# Patient Record
Sex: FEMALE | Race: BLACK OR AFRICAN AMERICAN | NOT HISPANIC OR LATINO | Employment: UNEMPLOYED | URBAN - METROPOLITAN AREA
[De-identification: names, ages, dates, MRNs, and addresses within clinical notes are randomized per-mention and may not be internally consistent; named-entity substitution may affect disease eponyms.]

---
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
## 2023-03-20 ENCOUNTER — LAB VISIT (OUTPATIENT)
Dept: LAB | Facility: CLINIC | Age: 18
End: 2023-03-20
Payer: MEDICAID

## 2023-03-20 ENCOUNTER — OFFICE VISIT (OUTPATIENT)
Dept: OBSTETRICS AND GYNECOLOGY | Facility: CLINIC | Age: 18
End: 2023-03-20
Payer: MEDICAID

## 2023-03-20 VITALS
HEART RATE: 84 BPM | BODY MASS INDEX: 21.74 KG/M2 | HEIGHT: 62 IN | SYSTOLIC BLOOD PRESSURE: 147 MMHG | WEIGHT: 118.13 LBS | DIASTOLIC BLOOD PRESSURE: 89 MMHG

## 2023-03-20 DIAGNOSIS — O20.0 THREATENED MISCARRIAGE: ICD-10-CM

## 2023-03-20 DIAGNOSIS — I10 CHRONIC HYPERTENSION: ICD-10-CM

## 2023-03-20 DIAGNOSIS — O20.0 THREATENED MISCARRIAGE: Primary | ICD-10-CM

## 2023-03-20 DIAGNOSIS — Z87.59 HISTORY OF POSTPARTUM HEMORRHAGE: ICD-10-CM

## 2023-03-20 PROBLEM — O99.019 ANTEPARTUM ANEMIA: Status: RESOLVED | Noted: 2021-08-06 | Resolved: 2023-03-20

## 2023-03-20 PROBLEM — Z30.9 ENCOUNTER FOR CONTRACEPTIVE MANAGEMENT: Status: RESOLVED | Noted: 2021-11-05 | Resolved: 2023-03-20

## 2023-03-20 LAB
BASOPHILS # BLD AUTO: 0.02 K/UL (ref 0–0.2)
BASOPHILS NFR BLD: 0.4 % (ref 0–1.9)
DIFFERENTIAL METHOD: ABNORMAL
EOSINOPHIL # BLD AUTO: 0 K/UL (ref 0–0.5)
EOSINOPHIL NFR BLD: 0.4 % (ref 0–8)
ERYTHROCYTE [DISTWIDTH] IN BLOOD BY AUTOMATED COUNT: 14.5 % (ref 11.5–14.5)
HCG INTACT+B SERPL-ACNC: NORMAL MIU/ML
HCT VFR BLD AUTO: 34.2 % (ref 37–48.5)
HGB BLD-MCNC: 11.1 G/DL (ref 12–16)
IMM GRANULOCYTES # BLD AUTO: 0.01 K/UL (ref 0–0.04)
IMM GRANULOCYTES NFR BLD AUTO: 0.2 % (ref 0–0.5)
LYMPHOCYTES # BLD AUTO: 1.5 K/UL (ref 1–4.8)
LYMPHOCYTES NFR BLD: 28.1 % (ref 18–48)
MCH RBC QN AUTO: 28.5 PG (ref 27–31)
MCHC RBC AUTO-ENTMCNC: 32.5 G/DL (ref 32–36)
MCV RBC AUTO: 88 FL (ref 82–98)
MONOCYTES # BLD AUTO: 0.3 K/UL (ref 0.3–1)
MONOCYTES NFR BLD: 6.1 % (ref 4–15)
NEUTROPHILS # BLD AUTO: 3.5 K/UL (ref 1.8–7.7)
NEUTROPHILS NFR BLD: 64.8 % (ref 38–73)
NRBC BLD-RTO: 0 /100 WBC
PLATELET # BLD AUTO: 266 K/UL (ref 150–450)
PMV BLD AUTO: 10.8 FL (ref 9.2–12.9)
RBC # BLD AUTO: 3.9 M/UL (ref 4–5.4)
WBC # BLD AUTO: 5.45 K/UL (ref 3.9–12.7)

## 2023-03-20 PROCEDURE — 3077F PR MOST RECENT SYSTOLIC BLOOD PRESSURE >= 140 MM HG: ICD-10-PCS | Mod: CPTII,S$GLB,, | Performed by: ADVANCED PRACTICE MIDWIFE

## 2023-03-20 PROCEDURE — 3079F DIAST BP 80-89 MM HG: CPT | Mod: CPTII,S$GLB,, | Performed by: ADVANCED PRACTICE MIDWIFE

## 2023-03-20 PROCEDURE — 3079F PR MOST RECENT DIASTOLIC BLOOD PRESSURE 80-89 MM HG: ICD-10-PCS | Mod: CPTII,S$GLB,, | Performed by: ADVANCED PRACTICE MIDWIFE

## 2023-03-20 PROCEDURE — 99213 OFFICE O/P EST LOW 20 MIN: CPT | Mod: TH,S$GLB,, | Performed by: ADVANCED PRACTICE MIDWIFE

## 2023-03-20 PROCEDURE — 3008F BODY MASS INDEX DOCD: CPT | Mod: CPTII,S$GLB,, | Performed by: ADVANCED PRACTICE MIDWIFE

## 2023-03-20 PROCEDURE — 36415 PR COLLECTION VENOUS BLOOD,VENIPUNCTURE: ICD-10-PCS | Mod: ,,, | Performed by: STUDENT IN AN ORGANIZED HEALTH CARE EDUCATION/TRAINING PROGRAM

## 2023-03-20 PROCEDURE — 99213 PR OFFICE/OUTPT VISIT, EST, LEVL III, 20-29 MIN: ICD-10-PCS | Mod: TH,S$GLB,, | Performed by: ADVANCED PRACTICE MIDWIFE

## 2023-03-20 PROCEDURE — 3077F SYST BP >= 140 MM HG: CPT | Mod: CPTII,S$GLB,, | Performed by: ADVANCED PRACTICE MIDWIFE

## 2023-03-20 PROCEDURE — 85025 COMPLETE CBC W/AUTO DIFF WBC: CPT | Performed by: ADVANCED PRACTICE MIDWIFE

## 2023-03-20 PROCEDURE — 36415 COLL VENOUS BLD VENIPUNCTURE: CPT | Mod: ,,, | Performed by: STUDENT IN AN ORGANIZED HEALTH CARE EDUCATION/TRAINING PROGRAM

## 2023-03-20 PROCEDURE — 84702 CHORIONIC GONADOTROPIN TEST: CPT | Performed by: ADVANCED PRACTICE MIDWIFE

## 2023-03-20 PROCEDURE — 3008F PR BODY MASS INDEX (BMI) DOCUMENTED: ICD-10-PCS | Mod: CPTII,S$GLB,, | Performed by: ADVANCED PRACTICE MIDWIFE

## 2023-03-20 NOTE — PROGRESS NOTES
"HISTORY OF PRESENT ILLNESS:    Luana Merlos is a 17 y.o. female, , Patient's last menstrual period was 2023.,  presents for a problem visit/threatened miscarriage. LMP of 23 = EDC of 10/31/23. Now ? 7 + 6/7. Seen at  [facility] on 3/4/23 for vaginal bleeding. US revealed IUP at 5 + 4/7 weeks with subchorionic hemorrhage. Quant HCG 15.900. Seen again at  [facility] ER on 3/6/23 after passage of "tissue like stuff". I am unable to view that note today at her visit. Since then, she c/o worsening N&V and intermittent dark brown spotting. No brvb, dysuria, fever/chills, supra pubic or abdominal pain. Pleasant, well groomed, NAD. ROS negative with exception of aforementioned:  Known to this provider from previous pregnancy.    Past Medical History:   Diagnosis Date    Gestational hypertension      OBHX:   19, second degree laceration repair. Male, # 8 lb 3 oz  Gestational HTN  tx with Labetalol, Chronic HTN tx by PCM with Norvasc (has stopped taking).  PMHX:  Denies major illness/injury  Umbilical hernia  SURGHX:  None  MEDS:  PNV  SOCHX:  Denies tobacco substance use or etoh in pregnancy.     History reviewed. No pertinent surgical history.    MEDICATIONS AND ALLERGIES:      Current Outpatient Medications:     prenatal vit,calc76-iron-folic 29 mg iron- 1 mg Tab, Take 1 tablet by mouth once daily., Disp: , Rfl:     Review of patient's allergies indicates:  No Known Allergies    Family History   Problem Relation Age of Onset    Hypertension Mother        Social History     Socioeconomic History    Marital status: Single   Tobacco Use    Smoking status: Never    Smokeless tobacco: Never   Substance and Sexual Activity    Alcohol use: Never    Drug use: Never    Sexual activity: Yes     Partners: Male     Birth control/protection: None     Comment: pap-never       ROS:  GENERAL: No weight changes. No swelling. No fatigue. No fever.  CARDIOVASCULAR: No chest pain. No shortness of breath. No leg " "cramps.   NEUROLOGICAL: No headaches. No vision changes.  BREASTS: No pain. No lumps. No discharge.  ABDOMEN: No pain.No constipation.+ N&V not relieved with comfort measures.  REPRODUCTIVE: See HPI  VULVA: No pain. No lesions. No itching.  VAGINA: No relaxation. No itching. No odor. No discharge. No lesions.  URINARY: No incontinence. No nocturia. No frequency. No dysuria.    BP (!) 147/89 (BP Location: Left arm, Patient Position: Sitting)   Pulse 84   Ht 5' 2" (1.575 m)   Wt 53.6 kg (118 lb 1.6 oz)   LMP 01/24/2023   BMI 21.60 kg/m²     PE:  APPEARANCE: Well nourished, well developed, in no acute distress.  ABDOMEN: Soft. No tenderness or masses. No hepatosplenomegaly. No hernias.  PELVIC: Deferred  EXTREMITIES: No edema      DIAGNOSIS & PLAN  1. Threatened miscarriage  hCG, quantitative    CBC Auto Differential    C. trachomatis/N. gonorrhoeae by AMP [facility]; Urine      2. Chronic hypertension        3. History of postpartum hemorrhage          Re orient to our collaborative ob/gyn practice.  Continue daily PNV.  RX for Zofran and Phenergan given/discussed.  S&S of SAB reinforced.  Quant HCG, CBC and Urine GC CHL today.  Discussed use of mychart for lab results and communication. At this point, plan NEELIMA in 2 weeks. She is aware that f/u may change based upon lab results. If quant reassuring, I will order US for dating/confirm FCA. Otherwise we will f/u as indicated.  Pelvic rest at this time.  "

## 2023-03-23 ENCOUNTER — PATIENT MESSAGE (OUTPATIENT)
Dept: OBSTETRICS AND GYNECOLOGY | Facility: CLINIC | Age: 18
End: 2023-03-23
Payer: MEDICAID

## 2023-03-23 DIAGNOSIS — Z36.89 ENCOUNTER FOR FETAL ANATOMIC SURVEY: ICD-10-CM

## 2023-03-23 DIAGNOSIS — O20.0 THREATENED MISCARRIAGE: ICD-10-CM

## 2023-04-03 ENCOUNTER — PROCEDURE VISIT (OUTPATIENT)
Dept: MATERNAL FETAL MEDICINE | Facility: CLINIC | Age: 18
End: 2023-04-03
Payer: MEDICAID

## 2023-04-03 DIAGNOSIS — O20.0 THREATENED MISCARRIAGE: ICD-10-CM

## 2023-04-03 PROCEDURE — 76801 OB US < 14 WKS SINGLE FETUS: CPT | Mod: S$GLB,,, | Performed by: OBSTETRICS & GYNECOLOGY

## 2023-04-03 PROCEDURE — 76801 US OB/GYN PROCEDURE (VIEWPOINT): ICD-10-PCS | Mod: S$GLB,,, | Performed by: OBSTETRICS & GYNECOLOGY

## 2023-07-07 ENCOUNTER — TELEPHONE (OUTPATIENT)
Dept: OBSTETRICS AND GYNECOLOGY | Facility: CLINIC | Age: 18
End: 2023-07-07

## 2023-07-07 NOTE — TELEPHONE ENCOUNTER
----- Message from Iraida Acevedo sent at 7/6/2023  4:55 PM CDT -----  Contact: Patient  Type:  Patient Returning Call    Who Called:Patient     Who Left Message for Patient:Unknown    Does the patient know what this is regarding?:appt scheduling     Would the patient rather a call back or a response via [facility]? Call    Best Call Back Number:[phone] (home)     Additional Information: Please return call

## 2023-08-02 ENCOUNTER — TELEPHONE (OUTPATIENT)
Dept: OBSTETRICS AND GYNECOLOGY | Facility: CLINIC | Age: 18
End: 2023-08-02

## 2023-08-02 NOTE — TELEPHONE ENCOUNTER
----- Message from Elaina Man CNM sent at 7/6/2023  7:18 PM CDT -----  Contact: Patient  More scheduling messages  ----- Message -----  From: Iraida Acevedo  Sent: 7/6/2023   4:55 PM CDT  To: Donovan Delaney Staff    Type:  Patient Returning Call    Who Called:Patient     Who Left Message for Patient:Unknown    Does the patient know what this is regarding?:appt scheduling     Would the patient rather a call back or a response via [facility]? Call    Best Call Back Number:[phone] ([place])     Additional Information: Please return call

## 2023-08-17 ENCOUNTER — LAB VISIT (OUTPATIENT)
Dept: LAB | Facility: CLINIC | Age: 18
End: 2023-08-17

## 2023-08-17 ENCOUNTER — OFFICE VISIT (OUTPATIENT)
Dept: OBSTETRICS AND GYNECOLOGY | Facility: CLINIC | Age: 18
End: 2023-08-17

## 2023-08-17 VITALS
SYSTOLIC BLOOD PRESSURE: 151 MMHG | WEIGHT: 127 LBS | HEIGHT: 63 IN | BODY MASS INDEX: 22.5 KG/M2 | DIASTOLIC BLOOD PRESSURE: 95 MMHG

## 2023-08-17 DIAGNOSIS — I10 CHRONIC HYPERTENSION: ICD-10-CM

## 2023-08-17 DIAGNOSIS — Z91.89 AT RISK FOR SEXUALLY TRANSMITTED DISEASE DUE TO UNPROTECTED SEX: ICD-10-CM

## 2023-08-17 DIAGNOSIS — Z30.42 ENCOUNTER FOR SURVEILLANCE OF INJECTABLE CONTRACEPTIVE: Primary | ICD-10-CM

## 2023-08-17 LAB
B-HCG UR QL: NEGATIVE
CTP QC/QA: YES

## 2023-08-17 PROCEDURE — 99214 OFFICE O/P EST MOD 30 MIN: CPT | Mod: 25,S$GLB,,

## 2023-08-17 PROCEDURE — 81025 URINE PREGNANCY TEST: CPT | Mod: S$GLB,,,

## 2023-08-17 PROCEDURE — 96372 PR INJECTION,THERAP/PROPH/DIAG2ST, IM OR SUBCUT: ICD-10-PCS | Mod: S$GLB,,,

## 2023-08-17 PROCEDURE — 96372 THER/PROPH/DIAG INJ SC/IM: CPT | Mod: S$GLB,,,

## 2023-08-17 PROCEDURE — 81025 POCT URINE PREGNANCY: ICD-10-PCS | Mod: S$GLB,,,

## 2023-08-17 PROCEDURE — 36415 PR COLLECTION VENOUS BLOOD,VENIPUNCTURE: ICD-10-PCS | Mod: ,,, | Performed by: STUDENT IN AN ORGANIZED HEALTH CARE EDUCATION/TRAINING PROGRAM

## 2023-08-17 PROCEDURE — 87389 HIV-1 AG W/HIV-1&-2 AB AG IA: CPT

## 2023-08-17 PROCEDURE — 99214 PR OFFICE/OUTPT VISIT, EST, LEVL IV, 30-39 MIN: ICD-10-PCS | Mod: 25,S$GLB,,

## 2023-08-17 PROCEDURE — 87591 N.GONORRHOEAE DNA AMP PROB: CPT

## 2023-08-17 PROCEDURE — 80074 ACUTE HEPATITIS PANEL: CPT

## 2023-08-17 PROCEDURE — 86592 SYPHILIS TEST NON-TREP QUAL: CPT

## 2023-08-17 PROCEDURE — 36415 COLL VENOUS BLD VENIPUNCTURE: CPT | Mod: ,,, | Performed by: STUDENT IN AN ORGANIZED HEALTH CARE EDUCATION/TRAINING PROGRAM

## 2023-08-17 RX ORDER — MEDROXYPROGESTERONE ACETATE 150 MG/ML
150 INJECTION, SUSPENSION INTRAMUSCULAR
Status: SHIPPED | OUTPATIENT
Start: 2023-08-17 | End: 2024-08-11

## 2023-08-17 RX ORDER — AMLODIPINE BESYLATE 5 MG/1
5 TABLET ORAL DAILY
Qty: 30 TABLET | Refills: 0 | Status: SHIPPED | OUTPATIENT
Start: 2023-08-17 | End: 2023-09-16

## 2023-08-17 RX ADMIN — MEDROXYPROGESTERONE ACETATE 150 MG: 150 INJECTION, SUSPENSION INTRAMUSCULAR at 02:08

## 2023-08-17 NOTE — PROGRESS NOTES
SUBJECTIVE:       Chief Complaint: Contraception    History of Present Illness: Luana Merlos is a 17 y.o. female  presenting for discussion of contraception and STD testing. Patient reports she is currently sexually active with a new male partner. She currently uses nothing for contraception. She  sometimes  use condoms for STD/STI prevention.    Review of Systems   Constitutional:  Negative for activity change, appetite change, chills, fatigue, fever and unexpected weight change.   HENT:  Negative for nasal congestion, dental problem, ear pain, postnasal drip, rhinorrhea, sinus pressure/congestion, sneezing and sore throat.    Eyes:  Negative for discharge, visual disturbance and eye dryness.   Respiratory:  Negative for cough, chest tightness and shortness of breath.    Cardiovascular:  Negative for chest pain, palpitations and leg swelling.   Gastrointestinal:  Negative for abdominal distention, abdominal pain, change in bowel habit, constipation, diarrhea, nausea, vomiting, reflux and change in bowel habit.   Endocrine: Negative for cold intolerance, heat intolerance, polydipsia and polyuria.   Genitourinary:  Negative for difficulty urinating, dyspareunia, dysuria, frequency, genital sores, hot flashes, menstrual irregularity, menstrual problem, pelvic pain, urgency, vaginal bleeding, vaginal discharge, vaginal pain and vaginal dryness.   Musculoskeletal:  Negative for back pain, myalgias, neck pain and neck stiffness.   Integumentary:  Negative for rash, breast mass, breast discharge and breast tenderness.   Allergic/Immunologic: Negative for environmental allergies and immunocompromised state.   Neurological:  Negative for dizziness, syncope, weakness, light-headedness, numbness and headaches.   Hematological:  Negative for adenopathy. Does not bruise/bleed easily.   Psychiatric/Behavioral:  Negative for confusion, decreased concentration and sleep disturbance. The patient is not  "nervous/anxious.    Breast: Negative for mass and tenderness        OBJECTIVE:      BP (!) 151/95 (BP Location: Right arm, Patient Position: Sitting)   Ht 5' 3" (1.6 m)   Wt 57.6 kg (127 lb)   LMP 07/21/2023 (Exact Date)   BMI 22.50 kg/m²     Physical Exam:   Constitutional: She is oriented to person, place, and time. Vital signs are normal. She appears well-developed and well-nourished. She is cooperative.    HENT:   Head: Normocephalic and atraumatic.      Cardiovascular:  Normal rate and regular rhythm.             Pulmonary/Chest: Effort normal.                  Musculoskeletal: Moves all extremeties.      Right lower leg: No edema.      Left lower leg: No edema.       Neurological: She is alert and oriented to person, place, and time. GCS eye subscore is 4. GCS verbal subscore is 5. GCS motor subscore is 6.    Skin: Skin is warm and dry. Capillary refill takes less than 2 seconds.    Psychiatric: She has a normal mood and affect. Her speech is normal and behavior is normal. Mood, affect and thought content normal.         ASSESSMENT:       1. Encounter for surveillance of injectable contraceptive    2. Chronic hypertension    3. At risk for sexually transmitted disease due to unprotected sex          PLAN:     Encounter for surveillance of injectable contraceptive  -     POCT Urine Pregnancy  -     medroxyPROGESTERone (DEPO-PROVERA) injection 150 mg    Chronic hypertension  -     amLODIPine (NORVASC) 5 MG tablet; Take 1 tablet (5 mg total) by mouth once daily.  Dispense: 30 tablet; Refill: 0    At risk for sexually transmitted disease due to unprotected sex  -     C. trachomatis/N. gonorrhoeae by AMP [facility]; Urine  -     HIV 1/2 Ag/Ab (4th Gen); Future; Expected date: 08/17/2023  -     Hepatitis Panel, Acute; Future; Expected date: 08/17/2023  -     RPR; Future; Expected date: 08/17/2023    Patient counseled today regarding contraceptive options including oral contraceptive pills, NuvaRing, transdermal " patch, depo-medroxyprogesterone injection, intrauterine device, Nexplanon. Risks, benefits, and alternatives of all these were discussed at length. Patient has chosen Depo-Provera injections. Administration compliance discussed. STD/STI counseling provided. Patient understands this does not protect against STDs/STIs and that the only current method of protection against STDs/STIs is consistent condom use. Educated on yearly STD screenings. STD/STI counseling provided. All questions answered. Patient verbalized understanding.    Follow up in about 3 months (around 11/17/2023) for Depo-Provera injection.

## 2023-08-18 LAB
C TRACH DNA SPEC QL NAA+PROBE: DETECTED
HAV IGM SERPL QL IA: NORMAL
HBV CORE IGM SERPL QL IA: NORMAL
HBV SURFACE AG SERPL QL IA: NORMAL
HCV AB SERPL QL IA: NORMAL
HIV 1+2 AB+HIV1 P24 AG SERPL QL IA: NORMAL
N GONORRHOEA DNA SPEC QL NAA+PROBE: NOT DETECTED
RPR SER QL: NORMAL

## 2023-08-22 DIAGNOSIS — A74.9 CHLAMYDIA INFECTION: Primary | ICD-10-CM

## 2023-08-22 RX ORDER — DOXYCYCLINE 100 MG/1
100 CAPSULE ORAL 2 TIMES DAILY
Qty: 14 CAPSULE | Refills: 0 | Status: SHIPPED | OUTPATIENT
Start: 2023-08-22 | End: 2023-08-29

## 2023-09-18 ENCOUNTER — PATIENT MESSAGE (OUTPATIENT)
Dept: OBSTETRICS AND GYNECOLOGY | Facility: CLINIC | Age: 18
End: 2023-09-18

## 2024-07-11 ENCOUNTER — OFFICE VISIT (OUTPATIENT)
Dept: FAMILY MEDICINE | Facility: CLINIC | Age: 19
End: 2024-07-11
Payer: MEDICAID

## 2024-07-11 VITALS
HEART RATE: 106 BPM | TEMPERATURE: 99 F | WEIGHT: 135.13 LBS | SYSTOLIC BLOOD PRESSURE: 128 MMHG | BODY MASS INDEX: 23.93 KG/M2 | DIASTOLIC BLOOD PRESSURE: 80 MMHG

## 2024-07-11 DIAGNOSIS — I10 CHRONIC HYPERTENSION: Primary | ICD-10-CM

## 2024-07-11 PROCEDURE — 3074F SYST BP LT 130 MM HG: CPT | Mod: CPTII,S$GLB,, | Performed by: FAMILY MEDICINE

## 2024-07-11 PROCEDURE — 99213 OFFICE O/P EST LOW 20 MIN: CPT | Mod: S$GLB,,, | Performed by: FAMILY MEDICINE

## 2024-07-11 PROCEDURE — 3079F DIAST BP 80-89 MM HG: CPT | Mod: CPTII,S$GLB,, | Performed by: FAMILY MEDICINE

## 2024-07-11 PROCEDURE — 3008F BODY MASS INDEX DOCD: CPT | Mod: CPTII,S$GLB,, | Performed by: FAMILY MEDICINE

## 2024-07-11 PROCEDURE — G2211 COMPLEX E/M VISIT ADD ON: HCPCS | Mod: S$GLB,,, | Performed by: FAMILY MEDICINE

## 2024-07-11 NOTE — PROGRESS NOTES
[facility] - [place], [place]    Family Medicine Office Visit    Chief Complaint   Patient presents with    Headache        HPI:  18 female with history of htn.  Had been experiencing headaches, so stopped amlodpine.  Headaches have resolved over the past month off of medication.    Wants general lab assessment to make sure healthy off of medication    ROS: as above    Vitals:    07/11/24 1208   BP: 128/80   Pulse: 106   Temp: 99.4 °F (37.4 °C)   Weight: 61.3 kg (135 lb 1.6 oz)      Body mass index is 23.93 kg/m².      General:  AOx3, well nourished and developed in no acute distress  Eyes:  PERRLA, EOMI, vision intact grossly  ENT:  normal hearing, moist oral mucosa  Neck:  trachea midline with no masses or thyromegaly  Heart:  RRR, no murmurs.  No edema noted, extremities warm and well perfused  Lungs:  clear to auscultation bilaterally with symmetric chest movement  Abdomen:  Soft, nontender, nondistended.  Normal bowel sounds  Musculoskeletal:  Normal gait.  Normal posture.  Normal muscular development with no joint swelling.  Neurological:  CN II-XII grossly intact. Symmetric strength and sensation  Psych:  Normal mood and affect.  Able to demonstrate good judgement and personal insight.      Assessment/Plan:    1. Chronic hypertension  -     CBC Auto Differential; Future; Expected date: 07/11/2024  -     Comprehensive Metabolic Panel; Future; Expected date: 07/11/2024  -     Hemoglobin A1C; Future; Expected date: 07/11/2024  -     Lipid Panel; Future; Expected date: 07/11/2024  -     TSH; Future; Expected date: 07/11/2024         Agree with efforts to reduce/eliminate medication.  Get annual labs    Visit today included increased complexity associated with the care of the episodic problem htn addressed and managing the longitudinal care of the patient due to the serious and/or complex managed problem(s) htn.

## 2025-01-09 ENCOUNTER — PATIENT MESSAGE (OUTPATIENT)
Dept: ADMINISTRATIVE | Facility: HOSPITAL | Age: 20
End: 2025-01-09
Payer: MEDICAID

## 2025-01-31 ENCOUNTER — OFFICE VISIT (OUTPATIENT)
Dept: FAMILY MEDICINE | Facility: CLINIC | Age: 20
End: 2025-01-31
Payer: MEDICAID

## 2025-01-31 VITALS
HEART RATE: 80 BPM | DIASTOLIC BLOOD PRESSURE: 88 MMHG | HEIGHT: 63 IN | SYSTOLIC BLOOD PRESSURE: 136 MMHG | BODY MASS INDEX: 24.54 KG/M2 | OXYGEN SATURATION: 100 % | WEIGHT: 138.5 LBS

## 2025-01-31 DIAGNOSIS — Z12.4 CERVICAL CANCER SCREENING: Primary | ICD-10-CM

## 2025-01-31 PROCEDURE — 1159F MED LIST DOCD IN RCRD: CPT | Mod: CPTII,S$GLB,, | Performed by: STUDENT IN AN ORGANIZED HEALTH CARE EDUCATION/TRAINING PROGRAM

## 2025-01-31 PROCEDURE — 99213 OFFICE O/P EST LOW 20 MIN: CPT | Mod: S$GLB,,, | Performed by: STUDENT IN AN ORGANIZED HEALTH CARE EDUCATION/TRAINING PROGRAM

## 2025-01-31 PROCEDURE — 1160F RVW MEDS BY RX/DR IN RCRD: CPT | Mod: CPTII,S$GLB,, | Performed by: STUDENT IN AN ORGANIZED HEALTH CARE EDUCATION/TRAINING PROGRAM

## 2025-01-31 PROCEDURE — 3079F DIAST BP 80-89 MM HG: CPT | Mod: CPTII,S$GLB,, | Performed by: STUDENT IN AN ORGANIZED HEALTH CARE EDUCATION/TRAINING PROGRAM

## 2025-01-31 PROCEDURE — 88175 CYTOPATH C/V AUTO FLUID REDO: CPT | Performed by: STUDENT IN AN ORGANIZED HEALTH CARE EDUCATION/TRAINING PROGRAM

## 2025-01-31 PROCEDURE — 3008F BODY MASS INDEX DOCD: CPT | Mod: CPTII,S$GLB,, | Performed by: STUDENT IN AN ORGANIZED HEALTH CARE EDUCATION/TRAINING PROGRAM

## 2025-01-31 PROCEDURE — 3075F SYST BP GE 130 - 139MM HG: CPT | Mod: CPTII,S$GLB,, | Performed by: STUDENT IN AN ORGANIZED HEALTH CARE EDUCATION/TRAINING PROGRAM

## 2025-01-31 NOTE — PATIENT INSTRUCTIONS
Hi [place],     If you are due for any health screening(s) below please notify me so we can arrange them to be ordered and scheduled. Most healthy patients at your age complete them, but you are free to accept or refuse.     If you can't do it, I'll definitely understand. If you can, I'd certainly appreciate it!    Tests to Keep You Healthy    Cervical Cancer Screening: ORDERED  Last Blood Pressure <= 139/89 (1/31/2025): Yes

## 2025-01-31 NOTE — PROGRESS NOTES
[facility] - [place], [place]    Family Medicine Office Visit    Subjective     Patient ID: Luana Merlos is a 19 y.o. female who presents to clinic today to follow up.     Medical history, surgical history, medications, allergies, and social history were reviewed and updated.     Chief Complaint: Gynecologic Exam    Gynecologic Exam        Presents today to complete her Pap smear.  This has her 1st 1.  She is taking Depo for birth control.  Last cycle was in December.  No abnormal vaginal bleeding or discharge.  No history of cervical cancer, colposcopy, biopsy, or cryotherapy.    Vitals:    01/31/25 1718   BP: 136/88   Pulse: 80      Wt Readings from Last 3 Encounters:   01/31/25 1718 62.8 kg (138 lb 8 oz)   07/11/24 1208 61.3 kg (135 lb 1.6 oz)   08/17/23 1354 57.6 kg (127 lb) (48%, Z= -0.05)*     * Growth percentiles are based on CDC (Girls, 2-20 Years) data.      Review of Systems    Review of Systems otherwise negative unless specified above.        Objective     Physical Exam  Vitals reviewed. Exam conducted with a chaperone present.   Constitutional:       General: She is not in acute distress.     Appearance: Normal appearance.   HENT:      Head: Normocephalic and atraumatic.      Nose: Nose normal.      Mouth/Throat:      Mouth: Mucous membranes are moist.   Cardiovascular:      Rate and Rhythm: Normal rate and regular rhythm.      Heart sounds: No murmur heard.  Pulmonary:      Effort: Pulmonary effort is normal. No respiratory distress.      Breath sounds: Normal breath sounds.   Chest:   Breasts:     Right: No mass, nipple discharge, skin change or tenderness.      Left: No mass, nipple discharge, skin change or tenderness.   Musculoskeletal:         General: Normal range of motion.   Skin:     General: Skin is warm and dry.   Neurological:      General: No focal deficit present.      Mental Status: She is alert and oriented to person, place, and time.   Psychiatric:          Mood and Affect: Mood normal.         Behavior: Behavior normal.     Pelvic exam: normal external genitalia, vulva, vagina, cervix, uterus and adnexa, VULVA: normal appearing vulva with no masses, tenderness or lesions, VAGINA: normal appearing vagina with normal color and discharge, no lesions, CERVIX: normal appearing cervix without discharge or lesions, PAP: Pap smear done today, exam chaperoned by Denise Montoya MA.         Assessment and Plan     Current Outpatient Medications   Medication Instructions    prenatal vit,calc76-iron-folic 29 mg iron- 1 mg Tab 1 tablet, Daily        1. Cervical cancer screening  -     Liquid-Based Pap Smear, Screening        Pap smear completed today.  We will notify patient of results when available.  Otherwise, we will schedule follow up with PCP after seeing cardiology at [facility].         Follow up in about 3 months (around 4/30/2025) for Follow up with PCP.    Questions were invited and answered. No other acute concerns at this time. Will plan to follow up as above or sooner if needed.     Farzaneh Foley DO  01/31/2025 5:43 PM       This dictation has been generated using Modal Fluency Dictation. Some phonetic errors may occur. Please contact author for clarification if needed.

## 2025-02-06 LAB
FINAL PATHOLOGIC DIAGNOSIS: NORMAL
Lab: NORMAL

## 2025-03-28 ENCOUNTER — OFFICE VISIT (OUTPATIENT)
Dept: FAMILY MEDICINE | Facility: CLINIC | Age: 20
End: 2025-03-28
Payer: MEDICAID

## 2025-03-28 VITALS
SYSTOLIC BLOOD PRESSURE: 110 MMHG | WEIGHT: 145.31 LBS | OXYGEN SATURATION: 99 % | DIASTOLIC BLOOD PRESSURE: 68 MMHG | HEIGHT: 63 IN | BODY MASS INDEX: 25.75 KG/M2 | HEART RATE: 107 BPM

## 2025-03-28 DIAGNOSIS — K52.9 DYSPEPTIC DIARRHEA: Primary | ICD-10-CM

## 2025-03-28 RX ORDER — FAMOTIDINE 40 MG/1
40 TABLET, FILM COATED ORAL DAILY
Qty: 15 TABLET | Refills: 0 | Status: SHIPPED | OUTPATIENT
Start: 2025-03-28 | End: 2025-04-12

## 2025-03-28 RX ORDER — MEDROXYPROGESTERONE ACETATE 150 MG/ML
150 INJECTION, SUSPENSION INTRAMUSCULAR
COMMUNITY

## 2025-03-28 NOTE — PROGRESS NOTES
Subjective:       Patient ID: Luana Merlos is a 19 y.o. female.    Chief Complaint: Abdominal Pain (Pt is here for abdominal pain, bloated)      History of Present Illness    CHIEF COMPLAINT:  La presents with complaints of bloating, gas, abdominal pain, nausea, diarrhea, and sulfur burps.    HPI:  La reports significant bloating and gas buildup, with difficulty releasing gas. She describes abdominal pain that occurs after eating, accompanied by nausea. She also has episodes of diarrhea and sulfur burps. The duration of these symptoms is not specified, but she indicates noticing the problem for some time, initially dismissing it.    Yesterday, her abdomen was significantly distended and firm, but today it is less firm, suggesting variability in symptom severity. She also reports occasional heartburn.    La recently had an ultrasound at [facility] to check for ovarian cysts due to pelvic pain. She accessed the results through her patient portal, which showed everything was normal with no cysts present. She is currently not taking birth control, having taken a break from using the Depo shot (which is administered every 3 months).    She denies being pregnant.    MEDICATIONS:  La was on birth control (Depo shot) administered as an injection every 3 months. She has discontinued the Depo shot and is currently taking a break from birth control.    MEDICAL HISTORY:  La has a history of ovarian cysts. A recent ultrasound was performed, and the results were normal with no cysts found. She also has a history of acid reflux/heartburn. La was previously on Depo shot for contraception but is not currently taking any form of birth control. She denies the possibility of pregnancy.    IMAGING:  A pelvic ultrasound was performed a few days ago. The results were normal, with no cysts or other abnormalities detected.         Review of Systems   Constitutional:  Negative for activity change, fever and unexpected weight  change.   Respiratory: Negative.     Cardiovascular: Negative.    Gastrointestinal:  Positive for abdominal distention, diarrhea and reflux.   Neurological: Negative.          Objective:      Physical Exam  Vitals and nursing note reviewed.   Constitutional:       Appearance: Normal appearance.   Cardiovascular:      Rate and Rhythm: Normal rate and regular rhythm.   Pulmonary:      Effort: Pulmonary effort is normal.      Breath sounds: Normal breath sounds.   Abdominal:      General: Bowel sounds are normal. There is no distension.      Palpations: Abdomen is soft.      Tenderness: There is no abdominal tenderness.   Musculoskeletal:      Cervical back: Normal range of motion and neck supple.   Neurological:      Mental Status: She is alert.         Assessment:       1. Dyspeptic diarrhea  Overview:  Indigestion    Assessment & Plan:  Add OTC meds  Reflux      Other orders  -     famotidine (PEPCID) 40 MG tablet; Take 1 tablet (40 mg total) by mouth once daily. for 15 days  Dispense: 15 tablet; Refill: 0           Plan:       Assessment & Plan    R14.0 Abdominal distension (gaseous)  G89.18 Other acute postprocedural pain  R11.0 Nausea  R19.7 Diarrhea, unspecified  K21.9 Gastro-esophageal reflux disease without esophagitis  Z87.42 Personal history of other diseases of the female genital tract    IMPRESSION:  - Assessed symptoms of bloating, gas, abdominal pain, nausea, diarrhea, and sulfur burps.  - Noted recent normal pelvic US results.  - Evaluated for potential acid reflux and indigestion.  - Considered OTC remedies for gas and diarrhea.    ABDOMINAL DISTENSION (GASEOUS):  - Observed the patient's belly to be slightly distended.  - Noted that the patient's belly was worse yesterday, being big and hard.  - Inquired about OTC treatments and duration of symptoms.  - Recommend purchasing OTC Gas-X for gas relief.  - Advised avoiding hard-to-digest foods like fast food, Mexican food, and fried food.  - Instructed  the patient to report experiencing bloating, gas buildup, and difficulty releasing gas.  - Noted the patient's report of sulfur burps.  - Explained that avoiding fast food, Mexican food, fried food and other hard-to-digest items may help improve symptoms until symptoms improve.    PELVIC PAIN:  - Noted the patient's report of pain over pelvic area, which led to an ultrasound.  - Reviewed ultrasound results, which were normal and showed no cysts.    NAUSEA:  - Noted the patient's report of experiencing nausea after eating.  - Prescribed Famotidine 20mg to be taken twice daily for indigestion.  - Recommend OTC Bismuth subsalicylate for additional symptom relief.    DIARRHEA:  - Noted the patient's report of experiencing diarrhea.  - Inquired about constipation or diarrhea.  - Recommend purchasing OTC Bismuth subsalicylate or Loperamide for diarrhea management.    GASTRO-ESOPHAGEAL REFLUX DISEASE:  - Noted the patient's report of experiencing heartburn sometimes.  - Prescribed Famotidine 20mg, to be taken twice daily before breakfast and dinner for indigestion and acid reflux.  - Instructed to continue this regimen for a few weeks.    FEMALE GENITAL TRACT HEALTH:  - Reviewed recent ultrasound results, which were normal and showed no ovarian cysts.  - Inquired about gynecologist visits and ultrasound results.  - Recommend discussing HPV vaccine at next gynecologist visit.  - Educated the patient on the importance of HPV vaccine for cancer prevention.    FOLLOW-UP:  - Information about treatment steps will be sent to patient's [employer] account.       Luana Toribio was seen today for abdominal pain.    Diagnoses and all orders for this visit:    Dyspeptic diarrhea    Other orders  -     famotidine (PEPCID) 40 MG tablet; Take 1 tablet (40 mg total) by mouth once daily. for 15 days            I spent a total of 20 minutes on the day of the visit.  This includes face to face time and non-face to face time preparing to see the  patient (eg, review of tests), obtaining and/or reviewing separately obtained history, documenting clinical information in the electronic or other health record, independently interpreting results and communicating results to the patient/family/caregiver, or care coordinator.

## 2025-05-19 ENCOUNTER — OFFICE VISIT (OUTPATIENT)
Dept: FAMILY MEDICINE | Facility: CLINIC | Age: 20
End: 2025-05-19
Payer: MEDICAID

## 2025-05-19 VITALS
WEIGHT: 149.25 LBS | DIASTOLIC BLOOD PRESSURE: 84 MMHG | BODY MASS INDEX: 26.45 KG/M2 | SYSTOLIC BLOOD PRESSURE: 110 MMHG | HEIGHT: 63 IN

## 2025-05-19 DIAGNOSIS — R53.83 FATIGUE, UNSPECIFIED TYPE: ICD-10-CM

## 2025-05-19 DIAGNOSIS — I10 HYPERTENSION, UNSPECIFIED TYPE: Primary | ICD-10-CM

## 2025-05-19 DIAGNOSIS — E05.90 SUBCLINICAL HYPERTHYROIDISM: ICD-10-CM

## 2025-05-19 PROCEDURE — 84075 ASSAY ALKALINE PHOSPHATASE: CPT | Performed by: FAMILY MEDICINE

## 2025-05-19 PROCEDURE — 85025 COMPLETE CBC W/AUTO DIFF WBC: CPT | Performed by: FAMILY MEDICINE

## 2025-05-19 PROCEDURE — 3008F BODY MASS INDEX DOCD: CPT | Mod: CPTII,S$GLB,, | Performed by: FAMILY MEDICINE

## 2025-05-19 PROCEDURE — 82306 VITAMIN D 25 HYDROXY: CPT | Performed by: FAMILY MEDICINE

## 2025-05-19 PROCEDURE — G2211 COMPLEX E/M VISIT ADD ON: HCPCS | Mod: S$GLB,,, | Performed by: FAMILY MEDICINE

## 2025-05-19 PROCEDURE — 3074F SYST BP LT 130 MM HG: CPT | Mod: CPTII,S$GLB,, | Performed by: FAMILY MEDICINE

## 2025-05-19 PROCEDURE — 83718 ASSAY OF LIPOPROTEIN: CPT | Performed by: FAMILY MEDICINE

## 2025-05-19 PROCEDURE — 3079F DIAST BP 80-89 MM HG: CPT | Mod: CPTII,S$GLB,, | Performed by: FAMILY MEDICINE

## 2025-05-19 PROCEDURE — 1159F MED LIST DOCD IN RCRD: CPT | Mod: CPTII,S$GLB,, | Performed by: FAMILY MEDICINE

## 2025-05-19 PROCEDURE — 83036 HEMOGLOBIN GLYCOSYLATED A1C: CPT | Performed by: FAMILY MEDICINE

## 2025-05-19 PROCEDURE — 84443 ASSAY THYROID STIM HORMONE: CPT | Performed by: FAMILY MEDICINE

## 2025-05-19 PROCEDURE — 99214 OFFICE O/P EST MOD 30 MIN: CPT | Mod: S$GLB,,, | Performed by: FAMILY MEDICINE

## 2025-05-19 NOTE — PATIENT INSTRUCTIONS
Get labs to check cause of fatigue  If labs normal, may consider medication to help with mood    Look into role as MA

## 2025-05-19 NOTE — PROGRESS NOTES
"    [facility] - [place], [place]    Family Medicine Office Visit    Chief Complaint   Patient presents with    Follow-up        HPI:  19 female with past history of palpitations and HTN  Also has past history of subclinical hyperthyroidism, which seems to have been self-limiting  Started on losartan by Cardiology - no concern for arrhythmia    BP at goal today  UTD with cervical cancer screen    Reporting increased urination, swelling, and generalized fatigue  Feels it could possibly be mood/anxiety related    ROS: as above    Vitals:    05/19/25 1112   BP: 110/84   BP Location: Left arm   Patient Position: Sitting   Weight: 67.7 kg (149 lb 4 oz)   Height: 5' 3" (1.6 m)      Body mass index is 26.44 kg/m².      General:  AOx3, well nourished and developed in no acute distress  Eyes:  PERRLA, EOMI, vision intact grossly  ENT:  normal hearing, moist oral mucosa  Neck:  trachea midline with no masses or thyromegaly  Heart:  RRR, no murmurs.  No edema noted, extremities warm and well perfused  Lungs:  clear to auscultation bilaterally with symmetric chest movement  Abdomen:  Soft, nontender, nondistended.  Normal bowel sounds  Musculoskeletal:  Normal gait.  Normal posture.  Normal muscular development with no joint swelling.  Neurological:  CN II-XII grossly intact. Symmetric strength and sensation  Psych:  Normal mood and affect.  Able to demonstrate good judgement and personal insight.      Assessment/Plan:    1. Hypertension, unspecified type    2. Subclinical hyperthyroidism    3. Fatigue, unspecified type       At goal  Get labs to assess thyroid function and cause of fatigue  Get labs to check organic cause, may consider treatment of mood/anxiety if labs normal    Visit today included increased complexity associated with the care of the episodic problem htn, thyriod, fatigue addressed and managing the longitudinal care of the patient due to the serious and/or complex managed " problem(s) htn, thyroid, fatigue.

## 2025-05-20 ENCOUNTER — RESULTS FOLLOW-UP (OUTPATIENT)
Dept: FAMILY MEDICINE | Facility: CLINIC | Age: 20
End: 2025-05-20

## 2025-08-21 ENCOUNTER — OFFICE VISIT (OUTPATIENT)
Dept: FAMILY MEDICINE | Facility: CLINIC | Age: 20
End: 2025-08-21
Payer: MEDICAID

## 2025-08-21 VITALS
WEIGHT: 150.13 LBS | HEIGHT: 63 IN | SYSTOLIC BLOOD PRESSURE: 120 MMHG | DIASTOLIC BLOOD PRESSURE: 78 MMHG | OXYGEN SATURATION: 99 % | HEART RATE: 90 BPM | BODY MASS INDEX: 26.6 KG/M2

## 2025-08-21 DIAGNOSIS — I10 HYPERTENSION, UNSPECIFIED TYPE: Primary | ICD-10-CM

## 2025-08-21 DIAGNOSIS — R11.0 NAUSEA: ICD-10-CM

## 2025-08-21 RX ORDER — ONDANSETRON 8 MG/1
8 TABLET, FILM COATED ORAL 2 TIMES DAILY
Qty: 30 TABLET | Refills: 2 | Status: SHIPPED | OUTPATIENT
Start: 2025-08-21